# Patient Record
Sex: FEMALE | Race: ASIAN | NOT HISPANIC OR LATINO | URBAN - METROPOLITAN AREA
[De-identification: names, ages, dates, MRNs, and addresses within clinical notes are randomized per-mention and may not be internally consistent; named-entity substitution may affect disease eponyms.]

---

## 2022-01-08 ENCOUNTER — EMERGENCY (EMERGENCY)
Facility: HOSPITAL | Age: 22
LOS: 1 days | Discharge: HOME | End: 2022-01-08
Attending: EMERGENCY MEDICINE
Payer: SELF-PAY

## 2022-01-08 VITALS
OXYGEN SATURATION: 98 % | HEIGHT: 65 IN | RESPIRATION RATE: 16 BRPM | SYSTOLIC BLOOD PRESSURE: 144 MMHG | DIASTOLIC BLOOD PRESSURE: 93 MMHG | TEMPERATURE: 98 F | WEIGHT: 110.01 LBS | HEART RATE: 84 BPM

## 2022-01-08 VITALS
HEART RATE: 69 BPM | OXYGEN SATURATION: 97 % | SYSTOLIC BLOOD PRESSURE: 100 MMHG | TEMPERATURE: 98 F | DIASTOLIC BLOOD PRESSURE: 68 MMHG | RESPIRATION RATE: 16 BRPM

## 2022-01-08 PROCEDURE — 99053 MED SERV 10PM-8AM 24 HR FAC: CPT

## 2022-01-08 PROCEDURE — 99283 EMERGENCY DEPT VISIT LOW MDM: CPT

## 2022-01-08 PROCEDURE — 99282 EMERGENCY DEPT VISIT SF MDM: CPT

## 2022-01-08 NOTE — ED PROVIDER NOTE - PATIENT PORTAL LINK FT
You can access the FollowMyHealth Patient Portal offered by [facility] by registering at the following website: [email or web address]. By joining [email or web address]s FollowMyHealth portal, you will also be able to view your health information using other applications (apps) compatible with our system.

## 2022-01-08 NOTE — ED PROVIDER NOTE - CLINICAL SUMMARY MEDICAL DECISION MAKING FREE TEXT BOX
DO Stan PGY-3: 20 y/o F presenting with reported numbness and heaviness of face s/p botox injection though exam without any sensory or motor deficits. Symptoms likely 2/2 to botox injection, possible facial or trigem nerve involvement. Will DC with f/u with botox center

## 2022-01-08 NOTE — ED PROVIDER NOTE - OBJECTIVE STATEMENT
20 y/o F with no reported PMH presenting with facial numbness. Patient states she had b/l masseter botox injections yesterday. States after the injection she noticed heaviness of her face. She feels swelling of lips and b/l jaw with tingling sensation.    Patient denies any difficulty breathing, swallowing

## 2022-01-08 NOTE — ED PROVIDER NOTE - ATTENDING CONTRIBUTION TO CARE
Pt s/p botox injections with diffuse facial absence of wrinkles with feelings of swelling of lower face.  Face not swollen, able to elevate either eyebrow, no wrinkles seen, CN 2-12 intact.  Likely peripheral neuropathy expected with botox injection.

## 2022-01-08 NOTE — ED PROVIDER NOTE - NSFOLLOWUPINSTRUCTIONS_ED_ALL_ED_FT
Botulinum Toxin Cosmetic Injection      Botulinum toxin injection is a shot that is given to soften lines and wrinkles in the face. The medicine works by weakening the muscles in the face. When muscles become weak, wrinkles are reduced.      Tell a doctor about:    •Any allergies you have. It is important to tell the doctor if you are allergic to eggs.    •All the medicines you take. This includes:  •Antibiotics.    •Vitamins.    •Herbs.    •Eye drops.    •Creams.    •Over-the-counter medicines.    •Any problems you or your family have had with the use of anesthetics.    •Any blood problems you have.    •Any surgeries you have had.    •Any medical conditions you have.    •If you are pregnant or may be pregnant.    •If you are breastfeeding.        What are the risks?  Generally, this is a safe procedure, but there may be problems. Common problems include:  •Pain.    •Swelling.    •Bruising.    Other problems include:  •Pain in the jaw.    •Pain in the neck.    •Allergic reaction to the shot.    •Damage to nearby structures or organs.    •Drooping eyebrow or eyelid.    •Double vision.    •Blurred vision.    •Changes in voice or speech.    •Inability to close one or both eyes.    •Trouble swallowing.    •Infection.        What happens before the procedure?    • Do not drink any alcohol as told by your doctor.    •Ask your doctor about:  •Changing or stopping your regular medicines. This is important if you take diabetes medicines or blood thinners.    •Taking medicines such as aspirin and ibuprofen. These medicines can thin your blood. Do not take these medicines before the procedure unless your doctor tells you to take them.    •Taking over-the-counter medicines, vitamins, herbs, and supplements.        What happens during the procedure?   •The area to be treated may be:  •Chilled with ice.    •Numbed with medicine (local anesthetic).      •Your doctor will give you some shots of the toxin. How many shots you will get depends on:  •Your condition.  •Which area is being treated.      The procedure may vary among doctors and hospitals.    What can I expect after the procedure?  After these shots, it is common to have:  •Pain, soreness, swelling, itching, or redness on your face.    •Small bruises from the needle.    •Bumps or marks from the needle.    •Loss of feeling in areas where the needle entered the body    •Headache. This is rare.      It may take up to 14 days for the treatment to work. It may work sooner for some people. Results will last for about 3–4 months. Follow-up treatment will make this last longer.      Follow these instructions at home:      Relieving pain and discomfort      •Take over-the-counter and prescription medicines only as told by your doctor.    •If told, put ice to the injected area:    •Put ice in a plastic bag.  •Place a towel between your skin and the bag.  •Leave the ice on for 20 minutes, 2–3 times per day.        Activity   Do not do any activities that take a lot of effort. Avoid these activities for 2 hours after the procedure or for as long as told by your doctor. This includes:  •Lifting heavy items.  •Working out.  •Doing activities that make your heart beat faster.      General instructions    •Do not lie down for 4 hours after treatment or as told by your doctor  •Do not rub the area where you got the shot. This can spread the toxin.    •Depending on where the shot was given:  •Your doctor may ask you not to move your muscles in that area. Follow what you are told.  •Your doctor may tell you to frown or squint regularly. You may need to do these exercises every 15 minutes for 1 hour after the shots. Follow what the doctor tells you.  •Do not get laser treatments, facials, or facial massages for 1–2 weeks after the procedure or for as long as told by your doctor.  •Keep all follow-up visits with your doctor. This is important.          Contact a doctor if:    •You have a headache that gets worse.  •You have a fever.      Get help right away if:  •You have signs of an allergic reaction. These include:  •An itchy rash or welts.  •Wheezing or shortness of breath.  •Dizziness.  •Your eyelids start to get droopy or swollen.  •You have trouble speaking  •You feel short of breath or have trouble breathing.  •You start to have trouble swallowing.        Summary    •Botulinum toxin injection is a shot that is given to soften lines and wrinkles in the face.  •This is a safe procedure. However, some problems may occur, including infection, drooping eyelid, double or blurred vision, and trouble swallowing.  •Follow your doctor's instructions before the procedure. These may include stopping or changing medicines or stopping alcohol use.  •You will be told how to care for yourself after the procedure.  •Get help right away if you have chest pain, fever, or an allergic reaction. Also, get help right away if you have problems with vision, trouble speaking, or you feel short of breath, become hoarse, or have trouble swallowing.      This information is not intended to replace advice given to you by your health care provider. Make sure you discuss any questions you have with your health care provider.

## 2022-01-08 NOTE — ED PROVIDER NOTE - PHYSICAL EXAMINATION
gen: well appearing  Mentation: AAO x 3  psych: mood appropriate  ENT: airway patent, no uvular swelling  Eyes: conjunctivae clear bilaterally  Cardio: RRR, no m/r/g  Resp: normal BS b/l  GI: s/nt/nd  Neuro: sensation and motor function intact, CN 2-12 intact  MSK: normal movement of all extremities

## 2022-01-08 NOTE — ED ADULT NURSE NOTE - OBJECTIVE STATEMENT
Pt states " I had Botox injunctions to both sides of my face on Thursday for my TMJ and about 4 hrs later I started feeling some numbness than tonight ( Friday) it seemed to get worse and feels like I was having difficulty/tightness with making facial expressions/face tighter and felt a little tighter with swallowing- able to swallow saliva"  On exam +minimal facial labial folds with smiling, forehead folds with closing eyes and raising eyebrows
